# Patient Record
Sex: MALE | Race: WHITE | URBAN - METROPOLITAN AREA
[De-identification: names, ages, dates, MRNs, and addresses within clinical notes are randomized per-mention and may not be internally consistent; named-entity substitution may affect disease eponyms.]

---

## 2017-02-25 DIAGNOSIS — R35.0 URINARY FREQUENCY: ICD-10-CM

## 2017-02-25 DIAGNOSIS — I10 ESSENTIAL HYPERTENSION WITH GOAL BLOOD PRESSURE LESS THAN 140/90: ICD-10-CM

## 2017-02-25 RX ORDER — AMLODIPINE BESYLATE 10 MG/1
10 TABLET ORAL DAILY
Qty: 30 TABLET | Refills: 0 | Status: SHIPPED | OUTPATIENT
Start: 2017-02-25 | End: 2017-05-25

## 2017-02-25 NOTE — TELEPHONE ENCOUNTER
Amlodipine Besylate 10mg      Last Written Prescription Date: 10/31/2016  Last Fill Quantity: 90,10/31/2016 # refills: 0    Last Office Visit with G, [facility] or [facility] prescribing provider:  10/31/2016-Dr Colvin   Future Office Visit:        BP Readings from Last 3 Encounters:   10/31/16 (!) 160/102   08/10/15 130/90   05/13/15 126/80

## 2017-02-25 NOTE — TELEPHONE ENCOUNTER
Routing refill request to provider for review/approval because:  Pippa given x1 and patient did not follow up, please advise  Labs out of range:  BP  Darnell Dale RN, BSN

## 2017-03-03 DIAGNOSIS — I10 ESSENTIAL HYPERTENSION WITH GOAL BLOOD PRESSURE LESS THAN 140/90: ICD-10-CM

## 2017-03-03 DIAGNOSIS — R35.0 URINARY FREQUENCY: ICD-10-CM

## 2017-03-03 NOTE — LETTER
[address] [id number] [phone]          March 7, 2017    Moustapha Negron                                                                                                                     [address] [place] [id number]            Dear Moustapha,    We recently received a call from your pharmacy requesting a refill of your medication (amlodipine).    A review of your chart indicates that an appointment is required.  Please contact our office at [phone] to schedule your doctor's appointment.    We have authorized one refill (LAST REFILL)  of your medication to allow time for you to schedule your appointment.    Taking care of your health is important to us and ongoing visits with your provider are vital to your care.  We look forward to seeing you in the near future.    Sincerely,    Hernando Colvin MD/Misa MARTINEZ

## 2017-03-06 RX ORDER — AMLODIPINE BESYLATE 10 MG/1
10 TABLET ORAL DAILY
Qty: 30 TABLET | Refills: 0 | Status: CANCELLED | OUTPATIENT
Start: 2017-03-06

## 2017-03-07 RX ORDER — AMLODIPINE BESYLATE 10 MG/1
TABLET ORAL
Qty: 30 TABLET | Refills: 0 | Status: SHIPPED | OUTPATIENT
Start: 2017-03-07 | End: 2017-05-25

## 2017-03-07 NOTE — TELEPHONE ENCOUNTER
Routing refill request to provider for review/approval because:  Pippa given x2 and patient did not follow up, please advise  Labs out of range:  BP  LMTRC  Darnell Dale RN, BSN

## 2017-05-25 ENCOUNTER — MYC MEDICAL ADVICE (OUTPATIENT)
Dept: FAMILY MEDICINE | Facility: CLINIC | Age: 47
End: 2017-05-25

## 2017-05-25 DIAGNOSIS — I10 ESSENTIAL HYPERTENSION WITH GOAL BLOOD PRESSURE LESS THAN 140/90: ICD-10-CM

## 2017-05-25 DIAGNOSIS — R35.0 URINARY FREQUENCY: ICD-10-CM

## 2017-05-25 RX ORDER — AMLODIPINE BESYLATE 10 MG/1
TABLET ORAL
Qty: 30 TABLET | Refills: 0 | Status: SHIPPED | OUTPATIENT
Start: 2017-05-25

## 2017-05-25 RX ORDER — LISINOPRIL 20 MG/1
20 TABLET ORAL 2 TIMES DAILY
Qty: 60 TABLET | Refills: 0 | Status: SHIPPED | OUTPATIENT
Start: 2017-05-25

## 2017-05-25 NOTE — TELEPHONE ENCOUNTER
Hernando Colvin MD, BP refill extended, see Sidewalk[employer] message, SAMANTHA    need to get a refill for my lisinopril and my almophine ...i know im suppose to make a appointment rush goff out of refills but im working out of town until the end of June. ill be coming home for the hoilday weekend and need to get thses refilled if i can get enough  til the end of june that would be great. I will then set up appointment to come in . Thanks Moustapha    BP Readings from Last 2 Encounters:   10/31/16 (!) 160/102   08/10/15 130/90     Lab Results   Component Value Date    CR 1.07 10/31/2016     Lab Results   Component Value Date    POTASSIUM 3.4 10/31/2016         Prescription approved per [facility] Refill Protocol.  Misa Connelly, RN, BSN

## 2017-09-15 ENCOUNTER — TELEPHONE (OUTPATIENT)
Dept: FAMILY MEDICINE | Facility: CLINIC | Age: 47
End: 2017-09-15

## 2017-09-15 NOTE — TELEPHONE ENCOUNTER
Panel Management Review      Patient has the following on his problem list:     Hypertension   Last three blood pressure readings:  BP Readings from Last 3 Encounters:   10/31/16 (!) 160/102   08/10/15 130/90   05/13/15 126/80     Blood pressure: FAILED    HTN Guidelines:  Age 18-59 BP range:  Less than 140/90  Age 60-85 with Diabetes:  Less than 140/90  Age 60-85 without Diabetes:  less than 150/90        Composite cancer screening  Chart review shows that this patient is due/due soon for the following None  Summary:    Patient is due/failing the following:   BP CHECK    Action needed:   Patient needs office visit for for BP.    Type of outreach:    Phone, left message for patient to call back.     Questions for provider review:    None                                                                                                                                    Isa Romero      Chart routed to Care Team .

## 2017-09-15 NOTE — LETTER
[facility]  [id number] [place] [id number] [phone]  September 29, 2017    Moustapha Negron  [address] [place] [phone]    Dear Moustapha,    I care about your health and have reviewed your health plan. I have reviewed your medical conditions, medication list, and lab results and am making recommendations based on this review, to better manage your health.    You are in particular need of attention regarding:  -High Blood Pressure    I am recommending that you:  {recommendations:-schedule a NURSE-ONLY BLOOD PRESSURE APPOINTMENT within the next 1-4 weeks.    Here is a list of Health Maintenance topics that are due now or due soon:  Health Maintenance Due   Topic Date Due     CMP Q1 YR  02/08/2017     MICROALBUMIN Q1 YEAR  02/08/2017     INFLUENZA VACCINE (SYSTEM ASSIGNED)  09/01/2017       Please call us at [phone] (or use [employer]) to address the above recommendations.     Thank you for trusting [facility] and we appreciate the opportunity to serve you.  We look forward to supporting your healthcare needs in the future.    Healthy Regards,    Hernando Colvin MD

## 2019-11-05 ENCOUNTER — HEALTH MAINTENANCE LETTER (OUTPATIENT)
Age: 49
End: 2019-11-05

## 2020-11-22 ENCOUNTER — HEALTH MAINTENANCE LETTER (OUTPATIENT)
Age: 50
End: 2020-11-22

## 2021-09-19 ENCOUNTER — HEALTH MAINTENANCE LETTER (OUTPATIENT)
Age: 51
End: 2021-09-19

## 2022-01-08 ENCOUNTER — HEALTH MAINTENANCE LETTER (OUTPATIENT)
Age: 52
End: 2022-01-08

## 2022-11-20 ENCOUNTER — HEALTH MAINTENANCE LETTER (OUTPATIENT)
Age: 52
End: 2022-11-20

## 2023-04-15 ENCOUNTER — HEALTH MAINTENANCE LETTER (OUTPATIENT)
Age: 53
End: 2023-04-15

## 2025-01-04 ENCOUNTER — HEALTH MAINTENANCE LETTER (OUTPATIENT)
Age: 55
End: 2025-01-04

## 2025-03-18 ENCOUNTER — TELEPHONE (OUTPATIENT)
Dept: ENDOCRINOLOGY | Facility: CLINIC | Age: 55
End: 2025-03-18
Payer: COMMERCIAL

## 2025-03-18 NOTE — TELEPHONE ENCOUNTER
Left Voicemail (1st Attempt) and Sent Mychart (1st Attempt) for the patient to call back and schedule the following:    Appointment type: NEW  Weight Management  Appointment mode: In Person or Virtual Visit  Provider: Viridiana Sheriff NP, Misa Bains PA-C, or Juana Faye PA-C  Return date: Approx. Next available  Specialty phone number: direct line  and [phone]     Additional Notes:   Reschedule 3/31/25 visit with Taylor Soler with another provider    RESCHEDULE Sequential appointments    Appointment type: New Med WT MGMT Nutrition  Appointment mode: Virtual Visit  Provider: Diane Hutchinson or Sherri Leal ([facility] location)    Bethel Warren, or Lisa Chavez in  Surgical Weight Loss ([facility] location)

## 2025-03-20 ENCOUNTER — TELEPHONE (OUTPATIENT)
Dept: ENDOCRINOLOGY | Facility: CLINIC | Age: 55
End: 2025-03-20
Payer: COMMERCIAL

## 2025-03-20 NOTE — TELEPHONE ENCOUNTER
Left Voicemail (2nd Attempt) and Sent Mychart (2nd Attempt) for the patient to call back and schedule the following:    Appointment type: New Weight Management  Appointment mode: In Person or Virtual Visit  Provider: Viridiana Sheriff NP, Misa Bains PA-C, or Juana Faye PA-C  Return date: Approx. Next available  Specialty phone number: direct line  and [phone]     Additional Notes:   Reschedule 3/31/25 visit with Taylor Soler with another provider.     RESCHEDULE Sequential appointments    Appointment type: New Med WT MGMT Nutrition  Appointment mode: Virtual Visit  Provider: Jannet Blackman, Bethel Lynn, or Lisa Chavez in [facility] ([facility] location) or Diane Hutchinson or Sherri Briggs ([facility] location)

## 2025-04-11 PROBLEM — I70.201: Status: ACTIVE | Noted: 2023-07-28

## 2025-04-11 PROBLEM — I11.9 HYPERTENSIVE HEART DISEASE: Status: ACTIVE | Noted: 2023-07-28

## 2025-04-11 PROBLEM — E78.5 HYPERLIPIDEMIA: Status: ACTIVE | Noted: 2020-05-12

## 2025-04-11 PROBLEM — E66.813 OBESITY, CLASS III, BMI 40-49.9 (MORBID OBESITY) (H): Status: ACTIVE | Noted: 2025-04-11

## 2025-04-11 PROBLEM — I65.22 ATHEROSCLEROSIS OF LEFT CAROTID ARTERY: Status: ACTIVE | Noted: 2023-07-28

## 2025-04-11 PROBLEM — E88.819 INSULIN RESISTANCE: Status: ACTIVE | Noted: 2023-07-28

## 2025-04-11 PROBLEM — G47.33 OBSTRUCTIVE SLEEP APNEA SYNDROME: Status: ACTIVE | Noted: 2020-05-12

## 2025-04-11 PROBLEM — M16.9 OSTEOARTHRITIS OF HIP: Status: ACTIVE | Noted: 2019-10-16

## 2025-05-06 ENCOUNTER — TELEPHONE (OUTPATIENT)
Dept: SURGERY | Facility: CLINIC | Age: 55
End: 2025-05-06
Payer: COMMERCIAL

## 2025-05-06 NOTE — TELEPHONE ENCOUNTER
Elaine from [employer] called to verify that Zepbound was denied and to let us and pt know.  Informed Elaine that we are aware and made pt aware as well.  Will discontinue Zepbound in EMR as to not have it active any longer and potentially cause other issues.  Xena Henning, MS, RD, RN

## 2025-08-05 ENCOUNTER — VIRTUAL VISIT (OUTPATIENT)
Dept: SURGERY | Facility: CLINIC | Age: 55
End: 2025-08-05
Payer: COMMERCIAL

## 2025-08-05 VITALS — BODY MASS INDEX: 41.75 KG/M2 | WEIGHT: 315 LBS | HEIGHT: 73 IN

## 2025-08-05 DIAGNOSIS — I10 HYPERTENSION GOAL BP (BLOOD PRESSURE) < 140/90: ICD-10-CM

## 2025-08-05 DIAGNOSIS — E66.813 OBESITY, CLASS III, BMI 40-49.9 (MORBID OBESITY) (H): Primary | ICD-10-CM

## 2025-08-05 DIAGNOSIS — G47.33 OBSTRUCTIVE SLEEP APNEA SYNDROME: ICD-10-CM

## 2025-08-05 DIAGNOSIS — E78.5 HYPERLIPIDEMIA, UNSPECIFIED HYPERLIPIDEMIA TYPE: ICD-10-CM

## 2025-08-05 PROCEDURE — 98006 SYNCH AUDIO-VIDEO EST MOD 30: CPT | Performed by: PHYSICIAN ASSISTANT

## 2025-08-25 DIAGNOSIS — E66.813 OBESITY, CLASS III, BMI 40-49.9 (MORBID OBESITY) (H): Primary | ICD-10-CM
